# Patient Record
Sex: FEMALE | Race: WHITE
[De-identification: names, ages, dates, MRNs, and addresses within clinical notes are randomized per-mention and may not be internally consistent; named-entity substitution may affect disease eponyms.]

---

## 2020-02-06 ENCOUNTER — NON-APPOINTMENT (OUTPATIENT)
Age: 70
End: 2020-02-06

## 2020-02-06 ENCOUNTER — APPOINTMENT (OUTPATIENT)
Dept: CARDIOLOGY | Facility: CLINIC | Age: 70
End: 2020-02-06
Payer: MEDICARE

## 2020-02-06 VITALS
SYSTOLIC BLOOD PRESSURE: 130 MMHG | RESPIRATION RATE: 17 BRPM | HEIGHT: 59.5 IN | HEART RATE: 58 BPM | DIASTOLIC BLOOD PRESSURE: 78 MMHG | TEMPERATURE: 97.9 F | BODY MASS INDEX: 19.89 KG/M2 | OXYGEN SATURATION: 100 % | WEIGHT: 100 LBS

## 2020-02-06 VITALS — SYSTOLIC BLOOD PRESSURE: 130 MMHG | DIASTOLIC BLOOD PRESSURE: 80 MMHG

## 2020-02-06 DIAGNOSIS — Z87.891 PERSONAL HISTORY OF NICOTINE DEPENDENCE: ICD-10-CM

## 2020-02-06 DIAGNOSIS — Z82.49 FAMILY HISTORY OF ISCHEMIC HEART DISEASE AND OTHER DISEASES OF THE CIRCULATORY SYSTEM: ICD-10-CM

## 2020-02-06 PROCEDURE — 99204 OFFICE O/P NEW MOD 45 MIN: CPT

## 2020-02-06 PROCEDURE — 93000 ELECTROCARDIOGRAM COMPLETE: CPT

## 2020-02-06 RX ORDER — SIMVASTATIN 20 MG/1
20 TABLET, FILM COATED ORAL
Refills: 0 | Status: ACTIVE | COMMUNITY

## 2020-02-06 NOTE — HISTORY OF PRESENT ILLNESS
[FreeTextEntry1] : Dear Yaz,\par She sought my evaluation for assessment of her chest pain.  She is a 69-year-old woman with a history of hyperlipidemia who woke up in the middle of the night with episodes of epigastric discomfort she took Pepcid and aspirin and the discomfort went away but then returned.  She is concerned that this episode was not her usual reflux and possibly related to cardiac issue.  She also notes some exertional shortness of breath that is more pronounced over the past week or so.  She also describes an occasional pressure or tightening sensation in her lower chest that lasts about 10 minutes and has no radiations or associated symptoms but does resolve spontaneously.  Her symptoms are not exertional.  When she does exercise she feels well.\par She has a history of ray nodes phenomenon.\par She has no history of coronary artery disease, hypertension, diabetes mellitus but does have a family history of premature coronary disease and is a former smoker.\par

## 2020-02-06 NOTE — REVIEW OF SYSTEMS
[see HPI] : see HPI [Dyspnea on exertion] : dyspnea during exertion [Chest  Pressure] : chest pressure [Chest Pain] : chest pain [Negative] : Heme/Lymph

## 2020-02-06 NOTE — DISCUSSION/SUMMARY
[FreeTextEntry1] : She is a 69-year-old with chronic hyperlipidemia and new onset of atypical chest pain and exertional shortness of breath.\par We reviewed the pathophysiology of atherosclerosis as well as the possibility that her symptoms are related to gastric irritation.  I suggested that she restart her Prevacid daily in case this is GI related.  I have scheduled her for an echocardiogram to exclude structural or pericardial disease as well as an exercise stress test with nuclear imaging in order to exclude ischemic heart disease as the source of her exertional symptoms and chest pain.

## 2020-02-19 ENCOUNTER — APPOINTMENT (OUTPATIENT)
Dept: CARDIOLOGY | Facility: CLINIC | Age: 70
End: 2020-02-19
Payer: MEDICARE

## 2020-02-19 PROCEDURE — 93306 TTE W/DOPPLER COMPLETE: CPT

## 2020-02-25 ENCOUNTER — APPOINTMENT (OUTPATIENT)
Dept: CARDIOLOGY | Facility: CLINIC | Age: 70
End: 2020-02-25
Payer: MEDICARE

## 2020-02-25 PROCEDURE — A9500: CPT

## 2020-02-25 PROCEDURE — 78452 HT MUSCLE IMAGE SPECT MULT: CPT

## 2020-02-25 PROCEDURE — 93015 CV STRESS TEST SUPVJ I&R: CPT

## 2020-03-17 ENCOUNTER — APPOINTMENT (OUTPATIENT)
Dept: CARDIOLOGY | Facility: CLINIC | Age: 70
End: 2020-03-17

## 2024-01-02 ENCOUNTER — APPOINTMENT (OUTPATIENT)
Dept: CARDIOLOGY | Facility: CLINIC | Age: 74
End: 2024-01-02
Payer: MEDICARE

## 2024-01-02 ENCOUNTER — NON-APPOINTMENT (OUTPATIENT)
Age: 74
End: 2024-01-02

## 2024-01-02 VITALS
WEIGHT: 99 LBS | DIASTOLIC BLOOD PRESSURE: 82 MMHG | BODY MASS INDEX: 19.66 KG/M2 | HEART RATE: 71 BPM | SYSTOLIC BLOOD PRESSURE: 110 MMHG | OXYGEN SATURATION: 97 %

## 2024-01-02 DIAGNOSIS — I73.00 RAYNAUD'S SYNDROME W/OUT GANGRENE: ICD-10-CM

## 2024-01-02 DIAGNOSIS — R07.89 OTHER CHEST PAIN: ICD-10-CM

## 2024-01-02 PROCEDURE — 99204 OFFICE O/P NEW MOD 45 MIN: CPT

## 2024-01-02 PROCEDURE — 93000 ELECTROCARDIOGRAM COMPLETE: CPT

## 2024-01-02 RX ORDER — PANTOPRAZOLE SODIUM 40 MG/1
40 TABLET, DELAYED RELEASE ORAL
Refills: 0 | Status: ACTIVE | COMMUNITY

## 2024-01-02 NOTE — DISCUSSION/SUMMARY
[FreeTextEntry1] : She is a 73-year-old with chronic hyperlipidemia and chronic atypical chest pain. Nodes issues seem to be her most concerning issue. CAD: Atherosclerotic plaque seen on CT is consistent with mild atherosclerosis.  She is to continue statin at her current dose with goal LDL less than 100 mg/dL.  Consider up titration of her statin to a more potent 1 if her LDL is greater than 100. Low-dose aspirin is appropriate. Raynaud's: Will try topical cream with nifedipine/Pentoxil filing and nitroglycerin.  [facility].  [EKG obtained to assist in diagnosis and management of assessed problem(s)] : EKG obtained to assist in diagnosis and management of assessed problem(s)

## 2024-01-02 NOTE — HISTORY OF PRESENT ILLNESS
[FreeTextEntry1] : PPI now. EGD with evidence of gastritis and GERD. Exercising by walking. 2-3 miles in an hour. Almost daily. Occasionally runs. No exertional chest pain or dyspnea. CT showed vascular calcification. Intense pain her fingers in the cold.    Prior: Deatri Mukherjee, She sought my evaluation for assessment of her chest pain.  She is a 69-year-old woman with a history of hyperlipidemia who woke up in the middle of the night with episodes of epigastric discomfort she took Pepcid and aspirin and the discomfort went away but then returned.  She is concerned that this episode was not her usual reflux and possibly related to cardiac issue.  She also notes some exertional shortness of breath that is more pronounced over the past week or so.  She also describes an occasional pressure or tightening sensation in her lower chest that lasts about 10 minutes and has no radiations or associated symptoms but does resolve spontaneously.  Her symptoms are not exertional.  When she does exercise she feels well. She has a history of ray nodes phenomenon. She has no history of coronary artery disease, hypertension, diabetes mellitus but does have a family history of premature coronary disease and is a former smoker.

## 2024-01-08 ENCOUNTER — LABORATORY RESULT (OUTPATIENT)
Age: 74
End: 2024-01-08

## 2024-01-16 ENCOUNTER — APPOINTMENT (OUTPATIENT)
Dept: ORTHOPEDIC SURGERY | Facility: CLINIC | Age: 74
End: 2024-01-16

## 2024-02-02 ENCOUNTER — APPOINTMENT (OUTPATIENT)
Dept: CARDIOLOGY | Facility: CLINIC | Age: 74
End: 2024-02-02
Payer: MEDICARE

## 2024-02-02 ENCOUNTER — NON-APPOINTMENT (OUTPATIENT)
Age: 74
End: 2024-02-02

## 2024-02-02 VITALS — HEART RATE: 82 BPM | OXYGEN SATURATION: 98 % | SYSTOLIC BLOOD PRESSURE: 98 MMHG | DIASTOLIC BLOOD PRESSURE: 70 MMHG

## 2024-02-02 VITALS
HEART RATE: 77 BPM | SYSTOLIC BLOOD PRESSURE: 100 MMHG | HEIGHT: 59.5 IN | OXYGEN SATURATION: 99 % | DIASTOLIC BLOOD PRESSURE: 68 MMHG

## 2024-02-02 DIAGNOSIS — R42 DIZZINESS AND GIDDINESS: ICD-10-CM

## 2024-02-02 PROCEDURE — 93000 ELECTROCARDIOGRAM COMPLETE: CPT

## 2024-02-02 PROCEDURE — 99214 OFFICE O/P EST MOD 30 MIN: CPT

## 2024-02-02 NOTE — HISTORY OF PRESENT ILLNESS
[FreeTextEntry1] : Seen today on an urgent basis because of an episode of near syncope. She describes being in her usual state of health, though she may have used her hand cream later in the day than usual, which when she went to sleep she had some abdominal discomfort and suddenly felt lightheaded while lying flat.  There was some vague graying out of her vision during this episode but no loss of consciousness.  She did not get up.  The sensation ultimately passed after 5 to 10 minutes. She reports feeling her usual self today. Definite palpitations during this episode.  Prior: PPI now. EGD with evidence of gastritis and GERD. Exercising by walking. 2-3 miles in an hour. Almost daily. Occasionally runs. No exertional chest pain or dyspnea. CT showed vascular calcification. Intense pain her fingers in the cold.    Prior: Josi Mukherjee, She sought my evaluation for assessment of her chest pain.  She is a 69-year-old woman with a history of hyperlipidemia who woke up in the middle of the night with episodes of epigastric discomfort she took Pepcid and aspirin and the discomfort went away but then returned.  She is concerned that this episode was not her usual reflux and possibly related to cardiac issue.  She also notes some exertional shortness of breath that is more pronounced over the past week or so.  She also describes an occasional pressure or tightening sensation in her lower chest that lasts about 10 minutes and has no radiations or associated symptoms but does resolve spontaneously.  Her symptoms are not exertional.  When she does exercise she feels well. She has a history of ray nodes phenomenon. She has no history of coronary artery disease, hypertension, diabetes mellitus but does have a family history of premature coronary disease and is a former smoker.

## 2024-02-02 NOTE — DISCUSSION/SUMMARY
[FreeTextEntry1] : She is a 73-year-old with chronic hyperlipidemia and chronic atypical chest pain. Raynaud's Symptoms have improved. She now presents with sudden lightheadedness and dizziness while lying flat. Lightheadedness/near syncope: Concern for vagal episode.  Labs and a 3-day cardiac monitor to exclude any metabolic arrhythmias. I encouraged her to keep her self well-hydrated and adding salt to her diet will be helpful. CAD: No anginal symptoms.  Her LDL is at goal.  Continue statin at her current dose. A Raynaud's: Continue topical cream with nifedipine/Pentoxil filing and nitroglycerin.  Patient told of possible hypotension with this medication and she should lie flat if this does occur and have someone bring her salt and water.  [EKG obtained to assist in diagnosis and management of assessed problem(s)] : EKG obtained to assist in diagnosis and management of assessed problem(s)

## 2024-02-04 LAB
ALBUMIN SERPL ELPH-MCNC: 4.6 G/DL
ALP BLD-CCNC: 68 U/L
ALT SERPL-CCNC: 22 U/L
ANION GAP SERPL CALC-SCNC: 12 MMOL/L
AST SERPL-CCNC: 24 U/L
BILIRUB SERPL-MCNC: 0.4 MG/DL
BUN SERPL-MCNC: 10 MG/DL
CALCIUM SERPL-MCNC: 9.8 MG/DL
CHLORIDE SERPL-SCNC: 96 MMOL/L
CO2 SERPL-SCNC: 23 MMOL/L
CREAT SERPL-MCNC: 0.61 MG/DL
EGFR: 93 ML/MIN/1.73M2
GLUCOSE SERPL-MCNC: 102 MG/DL
HCT VFR BLD CALC: 38.2 %
HGB BLD-MCNC: 12.5 G/DL
MAGNESIUM SERPL-MCNC: 2.3 MG/DL
MCHC RBC-ENTMCNC: 30.3 PG
MCHC RBC-ENTMCNC: 32.7 GM/DL
MCV RBC AUTO: 92.7 FL
PLATELET # BLD AUTO: 362 K/UL
POTASSIUM SERPL-SCNC: 4.3 MMOL/L
PROT SERPL-MCNC: 6.7 G/DL
RBC # BLD: 4.12 M/UL
RBC # FLD: 14.1 %
SODIUM SERPL-SCNC: 132 MMOL/L
WBC # FLD AUTO: 10.88 K/UL

## 2024-09-30 ENCOUNTER — APPOINTMENT (OUTPATIENT)
Dept: CARDIOLOGY | Facility: CLINIC | Age: 74
End: 2024-09-30
Payer: MEDICARE

## 2024-09-30 ENCOUNTER — NON-APPOINTMENT (OUTPATIENT)
Age: 74
End: 2024-09-30

## 2024-09-30 VITALS — OXYGEN SATURATION: 100 % | HEART RATE: 35 BPM | SYSTOLIC BLOOD PRESSURE: 160 MMHG | DIASTOLIC BLOOD PRESSURE: 80 MMHG

## 2024-09-30 VITALS
SYSTOLIC BLOOD PRESSURE: 120 MMHG | WEIGHT: 105 LBS | HEART RATE: 61 BPM | OXYGEN SATURATION: 99 % | BODY MASS INDEX: 20.85 KG/M2 | DIASTOLIC BLOOD PRESSURE: 80 MMHG

## 2024-09-30 DIAGNOSIS — R42 DIZZINESS AND GIDDINESS: ICD-10-CM

## 2024-09-30 DIAGNOSIS — I48.0 PAROXYSMAL ATRIAL FIBRILLATION: ICD-10-CM

## 2024-09-30 PROCEDURE — 93000 ELECTROCARDIOGRAM COMPLETE: CPT

## 2024-09-30 PROCEDURE — 99215 OFFICE O/P EST HI 40 MIN: CPT

## 2024-09-30 RX ORDER — RIVAROXABAN 20 MG/1
20 TABLET, FILM COATED ORAL
Qty: 90 | Refills: 1 | Status: ACTIVE | COMMUNITY
Start: 2024-09-30 | End: 1900-01-01

## 2024-09-30 NOTE — DISCUSSION/SUMMARY
[EKG obtained to assist in diagnosis and management of assessed problem(s)] : EKG obtained to assist in diagnosis and management of assessed problem(s) [FreeTextEntry1] : She is a 74-year-old with chronic hyperlipidemia and chronic atypical chest pain. Raynaud's Symptoms have improved. With episodes of lightheadedness and dizziness with an episode of atrial fibrillation with a slow ventricular rate noted on ECG today.  This was transient and her repeat ECG showed normal sinus rhythm at 63 bpm. I suspect that her lightheadedness and dizziness is related to her transient A-fib and bradycardia. I have suggested that she wear a 2-week monitor to verify this.

## 2024-09-30 NOTE — HISTORY OF PRESENT ILLNESS
[FreeTextEntry1] : felt lightheaded and dizzy upon awakening in the middle of the night while lying down. Called EMS, BP was low SBP 70's trended up to the 90's. No further episodes. recent workup with her internist, Dr. Chacon.   Prior: Seen today on an urgent basis because of an episode of near syncope. She describes being in her usual state of health, though she may have used her hand cream later in the day than usual, which when she went to sleep she had some abdominal discomfort and suddenly felt lightheaded while lying flat.  There was some vague graying out of her vision during this episode but no loss of consciousness.  She did not get up.  The sensation ultimately passed after 5 to 10 minutes. She reports feeling her usual self today. Definite palpitations during this episode.  Prior: PPI now. EGD with evidence of gastritis and GERD. Exercising by walking. 2-3 miles in an hour. Almost daily. Occasionally runs. No exertional chest pain or dyspnea. CT showed vascular calcification. Intense pain her fingers in the cold.    Prior: Deatri Mukherjee, She sought my evaluation for assessment of her chest pain.  She is a 69-year-old woman with a history of hyperlipidemia who woke up in the middle of the night with episodes of epigastric discomfort she took Pepcid and aspirin and the discomfort went away but then returned.  She is concerned that this episode was not her usual reflux and possibly related to cardiac issue.  She also notes some exertional shortness of breath that is more pronounced over the past week or so.  She also describes an occasional pressure or tightening sensation in her lower chest that lasts about 10 minutes and has no radiations or associated symptoms but does resolve spontaneously.  Her symptoms are not exertional.  When she does exercise she feels well. She has a history of ray nodes phenomenon. She has no history of coronary artery disease, hypertension, diabetes mellitus but does have a family history of premature coronary disease and is a former smoker.

## 2024-10-15 ENCOUNTER — APPOINTMENT (OUTPATIENT)
Dept: CARDIOLOGY | Facility: CLINIC | Age: 74
End: 2024-10-15
Payer: MEDICARE

## 2024-10-15 PROCEDURE — 93306 TTE W/DOPPLER COMPLETE: CPT

## 2024-10-29 ENCOUNTER — APPOINTMENT (OUTPATIENT)
Dept: CARDIOLOGY | Facility: CLINIC | Age: 74
End: 2024-10-29

## 2024-11-08 ENCOUNTER — NON-APPOINTMENT (OUTPATIENT)
Age: 74
End: 2024-11-08

## 2024-12-23 ENCOUNTER — NON-APPOINTMENT (OUTPATIENT)
Age: 74
End: 2024-12-23

## 2024-12-23 ENCOUNTER — APPOINTMENT (OUTPATIENT)
Dept: CARDIOLOGY | Facility: CLINIC | Age: 74
End: 2024-12-23
Payer: MEDICARE

## 2024-12-23 VITALS — DIASTOLIC BLOOD PRESSURE: 84 MMHG | HEART RATE: 67 BPM | OXYGEN SATURATION: 100 % | SYSTOLIC BLOOD PRESSURE: 150 MMHG

## 2024-12-23 DIAGNOSIS — R07.89 OTHER CHEST PAIN: ICD-10-CM

## 2024-12-23 DIAGNOSIS — I48.0 PAROXYSMAL ATRIAL FIBRILLATION: ICD-10-CM

## 2024-12-23 PROCEDURE — 99214 OFFICE O/P EST MOD 30 MIN: CPT

## 2024-12-23 PROCEDURE — G2211 COMPLEX E/M VISIT ADD ON: CPT

## 2024-12-23 PROCEDURE — 93000 ELECTROCARDIOGRAM COMPLETE: CPT

## 2025-01-06 ENCOUNTER — APPOINTMENT (OUTPATIENT)
Dept: CARDIOLOGY | Facility: CLINIC | Age: 75
End: 2025-01-06

## 2025-02-07 ENCOUNTER — NON-APPOINTMENT (OUTPATIENT)
Age: 75
End: 2025-02-07

## 2025-02-07 ENCOUNTER — APPOINTMENT (OUTPATIENT)
Dept: CARDIOLOGY | Facility: CLINIC | Age: 75
End: 2025-02-07

## 2025-02-07 ENCOUNTER — APPOINTMENT (OUTPATIENT)
Dept: CARDIOLOGY | Facility: CLINIC | Age: 75
End: 2025-02-07
Payer: MEDICARE

## 2025-02-07 VITALS
HEART RATE: 65 BPM | SYSTOLIC BLOOD PRESSURE: 120 MMHG | BODY MASS INDEX: 19.26 KG/M2 | WEIGHT: 97 LBS | DIASTOLIC BLOOD PRESSURE: 70 MMHG

## 2025-02-07 DIAGNOSIS — R00.2 PALPITATIONS: ICD-10-CM

## 2025-02-07 PROCEDURE — 99214 OFFICE O/P EST MOD 30 MIN: CPT

## 2025-02-07 PROCEDURE — 93000 ELECTROCARDIOGRAM COMPLETE: CPT

## 2025-03-05 DIAGNOSIS — E78.5 HYPERLIPIDEMIA, UNSPECIFIED: ICD-10-CM

## 2025-03-20 ENCOUNTER — APPOINTMENT (OUTPATIENT)
Dept: CARDIOLOGY | Facility: CLINIC | Age: 75
End: 2025-03-20

## 2025-03-24 ENCOUNTER — APPOINTMENT (OUTPATIENT)
Dept: CARDIOLOGY | Facility: CLINIC | Age: 75
End: 2025-03-24

## 2025-03-25 ENCOUNTER — APPOINTMENT (OUTPATIENT)
Dept: CARDIOLOGY | Facility: CLINIC | Age: 75
End: 2025-03-25
Payer: MEDICARE

## 2025-03-25 PROCEDURE — 78452 HT MUSCLE IMAGE SPECT MULT: CPT

## 2025-03-25 PROCEDURE — A9500: CPT

## 2025-03-25 PROCEDURE — 93015 CV STRESS TEST SUPVJ I&R: CPT

## 2025-04-02 RX ORDER — ATORVASTATIN CALCIUM 20 MG/1
20 TABLET, FILM COATED ORAL
Qty: 90 | Refills: 0 | Status: ACTIVE | COMMUNITY
Start: 2025-04-02 | End: 1900-01-01

## 2025-04-08 ENCOUNTER — APPOINTMENT (OUTPATIENT)
Dept: CARDIOLOGY | Facility: CLINIC | Age: 75
End: 2025-04-08

## 2025-04-10 ENCOUNTER — NON-APPOINTMENT (OUTPATIENT)
Age: 75
End: 2025-04-10

## 2025-04-16 ENCOUNTER — NON-APPOINTMENT (OUTPATIENT)
Age: 75
End: 2025-04-16